# Patient Record
Sex: FEMALE | URBAN - METROPOLITAN AREA
[De-identification: names, ages, dates, MRNs, and addresses within clinical notes are randomized per-mention and may not be internally consistent; named-entity substitution may affect disease eponyms.]

---

## 2022-08-15 ENCOUNTER — TELEPHONE (OUTPATIENT)
Dept: MAMMOGRAPHY | Facility: HOSPITAL | Age: 77
End: 2022-08-15

## 2022-08-15 NOTE — TELEPHONE ENCOUNTER
Spoke with Brett Lindsay regarding [place] Lymph Node mapping to be done in nuclear medicine department on 8-24-22 the day before lumpectomy surgery scheduled for 8-25-22 with Dr Jarrett Mckeon. Also reviewed wire localization to be done in [facility]. Both procedures explained and all questions answered. Pt to be transported to each department by [facility] through [employer]. Will make every effort to ensure privacy and safety when transported between departments. Breast Care Coordinator to provide education and written information regarding lumpectomy surgery, breast cancer and lymphedema. The patient is aware to arrive 45 minutes early on 8-24 for her Nuclear Medicine procedure and to park in the [place] garage. Pt verbalized understanding and had no further questions at this time.

## 2022-08-22 RX ORDER — ASPIRIN 81 MG/1
81 TABLET, CHEWABLE ORAL DAILY
COMMUNITY

## 2022-08-22 RX ORDER — GLIPIZIDE 10 MG/1
1 TABLET, FILM COATED, EXTENDED RELEASE ORAL 2 TIMES DAILY
COMMUNITY
Start: 2022-01-24

## 2022-08-22 RX ORDER — PEDI MULTIVIT NO.12 W-FLUORIDE 0.25 MG
1 TABLET,CHEWABLE ORAL DAILY
COMMUNITY

## 2022-08-22 RX ORDER — LEVOTHYROXINE SODIUM 0.03 MG/1
25 TABLET ORAL
COMMUNITY
Start: 2022-03-09

## 2022-08-22 RX ORDER — TURMERIC 400 MG
CAPSULE ORAL
COMMUNITY

## 2022-08-22 RX ORDER — LISINOPRIL 20 MG/1
TABLET ORAL
COMMUNITY
Start: 2022-07-03

## 2022-08-22 RX ORDER — ANTIOX #8/OM3/DHA/EPA/LUT/ZEAX 250-2.5 MG
1 CAPSULE ORAL 2 TIMES DAILY
COMMUNITY

## 2022-08-22 RX ORDER — IPRATROPIUM BROMIDE AND ALBUTEROL SULFATE 2.5; .5 MG/3ML; MG/3ML
3 SOLUTION RESPIRATORY (INHALATION) 4 TIMES DAILY
COMMUNITY
Start: 2022-02-17 | End: 2023-02-17

## 2022-08-22 RX ORDER — PRAVASTATIN SODIUM 20 MG
20 TABLET ORAL DAILY
COMMUNITY
Start: 2022-01-24

## 2022-08-22 RX ORDER — LEVOTHYROXINE SODIUM 0.15 MG/1
150 TABLET ORAL DAILY
COMMUNITY
Start: 2022-07-04

## 2022-08-22 RX ORDER — NIFEDIPINE 30 MG/1
1 TABLET, EXTENDED RELEASE ORAL DAILY
COMMUNITY
Start: 2022-06-04

## 2022-08-24 ENCOUNTER — HOSPITAL ENCOUNTER (OUTPATIENT)
Dept: NUCLEAR MEDICINE | Facility: HOSPITAL | Age: 77
Discharge: HOME | End: 2022-08-24
Attending: SURGERY
Payer: MEDICARE

## 2022-08-24 DIAGNOSIS — C50.012 MALIGNANT NEOPLASM INVOLVING BOTH NIPPLE AND AREOLA OF LEFT BREAST IN FEMALE (HCC): ICD-10-CM

## 2022-08-24 PROCEDURE — 78195 LYMPH SYSTEM IMAGING: CPT | Performed by: SURGERY

## 2022-08-24 RX ORDER — LIDOCAINE AND PRILOCAINE 25; 25 MG/G; MG/G
CREAM TOPICAL
Status: DISPENSED
Start: 2022-08-24 | End: 2022-08-24

## 2022-08-24 NOTE — PAT NURSING NOTE
Several attempt made to PCP office to obtain hard copy of EKG. Per Kenyatta Valles they do not have copy of EKG, it is at the hospital she had it done at, she will contact pt to attempt to get it. I will order EKG stat on admit.

## 2022-08-25 ENCOUNTER — HOSPITAL ENCOUNTER (OUTPATIENT)
Facility: HOSPITAL | Age: 77
Setting detail: HOSPITAL OUTPATIENT SURGERY
Discharge: HOME | End: 2022-08-25
Attending: SURGERY | Admitting: SURGERY
Payer: MEDICARE

## 2022-08-25 ENCOUNTER — HOSPITAL ENCOUNTER (OUTPATIENT)
Dept: MAMMOGRAPHY | Facility: HOSPITAL | Age: 77
Discharge: HOME | End: 2022-08-25
Attending: SURGERY
Payer: MEDICARE

## 2022-08-25 ENCOUNTER — ANESTHESIA (OUTPATIENT)
Dept: SURGERY | Facility: HOSPITAL | Age: 77
End: 2022-08-25
Payer: MEDICARE

## 2022-08-25 ENCOUNTER — ANESTHESIA EVENT (OUTPATIENT)
Dept: SURGERY | Facility: HOSPITAL | Age: 77
End: 2022-08-25
Payer: MEDICARE

## 2022-08-25 VITALS
HEART RATE: 75 BPM | SYSTOLIC BLOOD PRESSURE: 140 MMHG | DIASTOLIC BLOOD PRESSURE: 78 MMHG | OXYGEN SATURATION: 97 % | HEIGHT: 62 IN | WEIGHT: 244 LBS | BODY MASS INDEX: 44.9 KG/M2 | RESPIRATION RATE: 18 BRPM | TEMPERATURE: 98 F

## 2022-08-25 DIAGNOSIS — Z01.812 ENCOUNTER FOR PREOPERATIVE SCREENING LABORATORY TESTING FOR COVID-19 VIRUS: Primary | ICD-10-CM

## 2022-08-25 DIAGNOSIS — Z20.822 ENCOUNTER FOR PREOPERATIVE SCREENING LABORATORY TESTING FOR COVID-19 VIRUS: Primary | ICD-10-CM

## 2022-08-25 DIAGNOSIS — C50.012 MALIGNANT NEOPLASM INVOLVING BOTH NIPPLE AND AREOLA OF LEFT BREAST IN FEMALE (HCC): ICD-10-CM

## 2022-08-25 LAB
ATRIAL RATE: 72 BPM
GLUCOSE BLD-MCNC: 124 MG/DL (ref 70–99)
GLUCOSE BLD-MCNC: 150 MG/DL (ref 70–99)
P AXIS: 58 DEGREES
P-R INTERVAL: 178 MS
Q-T INTERVAL: 416 MS
QRS DURATION: 94 MS
QTC CALCULATION (BEZET): 455 MS
R AXIS: 19 DEGREES
SARS-COV-2 RNA RESP QL NAA+PROBE: NOT DETECTED
T AXIS: 21 DEGREES
VENTRICULAR RATE: 72 BPM

## 2022-08-25 PROCEDURE — 82962 GLUCOSE BLOOD TEST: CPT

## 2022-08-25 PROCEDURE — 88307 TISSUE EXAM BY PATHOLOGIST: CPT | Performed by: SURGERY

## 2022-08-25 PROCEDURE — 88305 TISSUE EXAM BY PATHOLOGIST: CPT | Performed by: SURGERY

## 2022-08-25 PROCEDURE — 76098 X-RAY EXAM SURGICAL SPECIMEN: CPT | Performed by: SURGERY

## 2022-08-25 PROCEDURE — 19281 PERQ DEVICE BREAST 1ST IMAG: CPT | Performed by: SURGERY

## 2022-08-25 PROCEDURE — 0HBU0ZZ EXCISION OF LEFT BREAST, OPEN APPROACH: ICD-10-PCS | Performed by: SURGERY

## 2022-08-25 PROCEDURE — 07B60ZX EXCISION OF LEFT AXILLARY LYMPHATIC, OPEN APPROACH, DIAGNOSTIC: ICD-10-PCS | Performed by: SURGERY

## 2022-08-25 PROCEDURE — 93005 ELECTROCARDIOGRAM TRACING: CPT

## 2022-08-25 PROCEDURE — 93010 ELECTROCARDIOGRAM REPORT: CPT | Performed by: INTERNAL MEDICINE

## 2022-08-25 RX ORDER — CEFAZOLIN SODIUM/WATER 2 G/20 ML
2 SYRINGE (ML) INTRAVENOUS ONCE
Status: COMPLETED | OUTPATIENT
Start: 2022-08-25 | End: 2022-08-25

## 2022-08-25 RX ORDER — ISOSULFAN BLUE 50 MG/5ML
INJECTION, SOLUTION SUBCUTANEOUS AS NEEDED
Status: DISCONTINUED | OUTPATIENT
Start: 2022-08-25 | End: 2022-08-25 | Stop reason: HOSPADM

## 2022-08-25 RX ORDER — HYDROCODONE BITARTRATE AND ACETAMINOPHEN 5; 325 MG/1; MG/1
1 TABLET ORAL ONCE AS NEEDED
Status: COMPLETED | OUTPATIENT
Start: 2022-08-25 | End: 2022-08-25

## 2022-08-25 RX ORDER — DEXTROSE MONOHYDRATE 25 G/50ML
50 INJECTION, SOLUTION INTRAVENOUS
Status: DISCONTINUED | OUTPATIENT
Start: 2022-08-25 | End: 2022-08-25 | Stop reason: HOSPADM

## 2022-08-25 RX ORDER — DIAZEPAM 5 MG/1
5 TABLET ORAL AS NEEDED
Status: DISCONTINUED | OUTPATIENT
Start: 2022-08-25 | End: 2022-08-25 | Stop reason: HOSPADM

## 2022-08-25 RX ORDER — SODIUM CHLORIDE, SODIUM LACTATE, POTASSIUM CHLORIDE, CALCIUM CHLORIDE 600; 310; 30; 20 MG/100ML; MG/100ML; MG/100ML; MG/100ML
INJECTION, SOLUTION INTRAVENOUS CONTINUOUS
Status: DISCONTINUED | OUTPATIENT
Start: 2022-08-25 | End: 2022-08-25

## 2022-08-25 RX ORDER — HYDROCODONE BITARTRATE AND ACETAMINOPHEN 5; 325 MG/1; MG/1
1-2 TABLET ORAL EVERY 4 HOURS PRN
Qty: 20 TABLET | Refills: 0 | Status: SHIPPED | OUTPATIENT
Start: 2022-08-25

## 2022-08-25 RX ORDER — NICOTINE POLACRILEX 4 MG
15 LOZENGE BUCCAL
Status: DISCONTINUED | OUTPATIENT
Start: 2022-08-25 | End: 2022-08-25 | Stop reason: HOSPADM

## 2022-08-25 RX ORDER — ACETAMINOPHEN 500 MG
1000 TABLET ORAL ONCE AS NEEDED
Status: COMPLETED | OUTPATIENT
Start: 2022-08-25 | End: 2022-08-25

## 2022-08-25 RX ORDER — NALOXONE HYDROCHLORIDE 0.4 MG/ML
80 INJECTION, SOLUTION INTRAMUSCULAR; INTRAVENOUS; SUBCUTANEOUS AS NEEDED
Status: DISCONTINUED | OUTPATIENT
Start: 2022-08-25 | End: 2022-08-25

## 2022-08-25 RX ORDER — HYDROCODONE BITARTRATE AND ACETAMINOPHEN 5; 325 MG/1; MG/1
2 TABLET ORAL ONCE AS NEEDED
Status: COMPLETED | OUTPATIENT
Start: 2022-08-25 | End: 2022-08-25

## 2022-08-25 RX ORDER — LIDOCAINE HYDROCHLORIDE 10 MG/ML
INJECTION, SOLUTION EPIDURAL; INFILTRATION; INTRACAUDAL; PERINEURAL AS NEEDED
Status: DISCONTINUED | OUTPATIENT
Start: 2022-08-25 | End: 2022-08-25 | Stop reason: SURG

## 2022-08-25 RX ORDER — HYDROMORPHONE HYDROCHLORIDE 1 MG/ML
0.4 INJECTION, SOLUTION INTRAMUSCULAR; INTRAVENOUS; SUBCUTANEOUS EVERY 5 MIN PRN
Status: DISCONTINUED | OUTPATIENT
Start: 2022-08-25 | End: 2022-08-25

## 2022-08-25 RX ORDER — ONDANSETRON 2 MG/ML
4 INJECTION INTRAMUSCULAR; INTRAVENOUS EVERY 6 HOURS PRN
Status: DISCONTINUED | OUTPATIENT
Start: 2022-08-25 | End: 2022-08-25

## 2022-08-25 RX ORDER — LIDOCAINE HYDROCHLORIDE 10 MG/ML
INJECTION, SOLUTION INFILTRATION; PERINEURAL AS NEEDED
Status: DISCONTINUED | OUTPATIENT
Start: 2022-08-25 | End: 2022-08-25 | Stop reason: HOSPADM

## 2022-08-25 RX ORDER — METOCLOPRAMIDE HYDROCHLORIDE 5 MG/ML
10 INJECTION INTRAMUSCULAR; INTRAVENOUS EVERY 8 HOURS PRN
Status: DISCONTINUED | OUTPATIENT
Start: 2022-08-25 | End: 2022-08-25

## 2022-08-25 RX ORDER — HYDROMORPHONE HYDROCHLORIDE 1 MG/ML
0.6 INJECTION, SOLUTION INTRAMUSCULAR; INTRAVENOUS; SUBCUTANEOUS EVERY 5 MIN PRN
Status: DISCONTINUED | OUTPATIENT
Start: 2022-08-25 | End: 2022-08-25

## 2022-08-25 RX ORDER — MEPERIDINE HYDROCHLORIDE 25 MG/ML
12.5 INJECTION INTRAMUSCULAR; INTRAVENOUS; SUBCUTANEOUS AS NEEDED
Status: DISCONTINUED | OUTPATIENT
Start: 2022-08-25 | End: 2022-08-25

## 2022-08-25 RX ORDER — NICOTINE POLACRILEX 4 MG
30 LOZENGE BUCCAL
Status: DISCONTINUED | OUTPATIENT
Start: 2022-08-25 | End: 2022-08-25 | Stop reason: HOSPADM

## 2022-08-25 RX ORDER — DEXAMETHASONE SODIUM PHOSPHATE 4 MG/ML
VIAL (ML) INJECTION AS NEEDED
Status: DISCONTINUED | OUTPATIENT
Start: 2022-08-25 | End: 2022-08-25 | Stop reason: SURG

## 2022-08-25 RX ORDER — ONDANSETRON 2 MG/ML
INJECTION INTRAMUSCULAR; INTRAVENOUS AS NEEDED
Status: DISCONTINUED | OUTPATIENT
Start: 2022-08-25 | End: 2022-08-25 | Stop reason: SURG

## 2022-08-25 RX ORDER — HYDROMORPHONE HYDROCHLORIDE 1 MG/ML
0.2 INJECTION, SOLUTION INTRAMUSCULAR; INTRAVENOUS; SUBCUTANEOUS EVERY 5 MIN PRN
Status: DISCONTINUED | OUTPATIENT
Start: 2022-08-25 | End: 2022-08-25

## 2022-08-25 RX ORDER — ACETAMINOPHEN 500 MG
1000 TABLET ORAL ONCE
Status: DISCONTINUED | OUTPATIENT
Start: 2022-08-25 | End: 2022-08-25 | Stop reason: HOSPADM

## 2022-08-25 RX ADMIN — SODIUM CHLORIDE, SODIUM LACTATE, POTASSIUM CHLORIDE, CALCIUM CHLORIDE: 600; 310; 30; 20 INJECTION, SOLUTION INTRAVENOUS at 10:21:00

## 2022-08-25 RX ADMIN — DEXAMETHASONE SODIUM PHOSPHATE 4 MG: 4 MG/ML VIAL (ML) INJECTION at 10:26:00

## 2022-08-25 RX ADMIN — LIDOCAINE HYDROCHLORIDE 50 MG: 10 INJECTION, SOLUTION EPIDURAL; INFILTRATION; INTRACAUDAL; PERINEURAL at 10:24:00

## 2022-08-25 RX ADMIN — SODIUM CHLORIDE, SODIUM LACTATE, POTASSIUM CHLORIDE, CALCIUM CHLORIDE: 600; 310; 30; 20 INJECTION, SOLUTION INTRAVENOUS at 12:24:00

## 2022-08-25 RX ADMIN — ONDANSETRON 4 MG: 2 INJECTION INTRAMUSCULAR; INTRAVENOUS at 10:35:00

## 2022-08-25 RX ADMIN — CEFAZOLIN SODIUM/WATER 2 G: 2 G/20 ML SYRINGE (ML) INTRAVENOUS at 10:26:00

## 2022-08-25 NOTE — IMAGING NOTE
Assisted  with mammography guided needle localization of the left breast.   Arcadio Kumar identified with spelling of name and date of birth. Medications and allergies reviewed. NKDA reported. History: left breast  Invasive ductal carcinoma. Surgery: LEFT BREAST NEEDLE LOCALIZATION LUMPECTOMY WITH LEFT SENTINEL LYMPH NODE BIOPSY    Order verified. Procedure explained and questions answered. Arcadio Kumar verbalized understanding and agreement. 0845: Written consent obtained by imaging staff.      0900: Scans taken by [facility]-mammography technologist    1621: Site prepped in a sterile manner. 4091: Dr. Rosalie Vieira  present    96 86 26: Time out complete    0905: Lidocaine administered for anesthetic affect.  0906: Tuloc 20G x 120mm needle placed left breast 12 o'clock position butterfly shaped marker    Emotional support provided. Arcadio Kumar tolerated procedure well. Site cleaned. Wire secured with pink clip, steri strips, sterile 4x4 gauze dressing, and Tegaderm. Arcadio Kumar transported via wheelchair to pre-op/surgery holding in stable condition. Ms. Yuridia Medellin without complaints or concerns at this time.

## 2022-08-25 NOTE — BRIEF OP NOTE
Pre-Operative Diagnosis: MALIGNANT NEOPLASM OF LEFT BREAST     Post-Operative Diagnosis: MALIGNANT NEOPLASM OF LEFT BREAST      Procedure Performed:   LEFT BREAST NEEDLE LOCALIZATION LUMPECTOMY WITH LEFT SENTINEL LYMPH NODE BIOPSY    Surgeon(s) and Role:     * Opal Mendez MD - Primary    Assistant(s):  PA: VINAY Peralta     Assistant was medically necessary for patient positioning, suctioning and retraction of tissues     Surgical Findings: see above     Specimen: to pathology     Estimated Blood Loss: Blood Output: 10 mL (8/25/2022 11:54 AM)      Reji Levine MD  8/25/2022  12:23 PM

## 2022-08-25 NOTE — OPERATIVE REPORT
Pre-Operative Diagnosis: MALIGNANT NEOPLASM OF LEFT BREAST     Post-Operative Diagnosis: MALIGNANT NEOPLASM OF LEFT BREAST      Procedure Performed:   LEFT BREAST NEEDLE LOCALIZATION LUMPECTOMY WITH LEFT SENTINEL LYMPH NODE BIOPSY    Surgeon(s) and Role:     * Rachel Gomez MD - Primary    Assistant(s):  PA: VINAY Tejada     Assistant was medically necessary for patient positioning, suctioning and retraction of tissues     Surgical Findings: see above     Specimen: to pathology     Estimated Blood Loss: Blood Output: 10 mL (8/25/2022 11:54 AM)      Joslyn Fried MD

## 2022-08-25 NOTE — ANESTHESIA PROCEDURE NOTES
Airway  Date/Time: 8/25/2022 10:25 AM  Urgency: elective      General Information and Staff    Patient location during procedure: OR  Anesthesiologist: Arabella Masterson MD  Resident/CRNA: Shirley Barajas CRNA  Performed: CRNA     Indications and Patient Condition  Indications for airway management: anesthesia  Sedation level: deep  Preoxygenated: yes  Patient position: sniffing  Mask difficulty assessment: 0 - not attempted    Final Airway Details  Final airway type: supraglottic airway      Successful airway: classic  Size 3      Number of attempts at approach: 1

## 2022-08-25 NOTE — ANESTHESIA POSTPROCEDURE EVALUATION
[facility] Patient Status:  Hospital Outpatient Surgery   Age/Gender 65year old female MRN [id number]   [facility] SURGERY Attending Warren Pierce MD   Hosp Day # 0 PCP APRIL AVILA       Anesthesia Post-op Note    LEFT BREAST NEEDLE LOCALIZATION LUMPECTOMY WITH LEFT SENTINEL LYMPH NODE BIOPSY    Procedure Summary     Date: 08/25/22 Room / Location: [facility] MAIN OR [address] [place] [address] MAIN OR    Anesthesia Start: 8037 Anesthesia Stop: 1286    Procedure: LEFT BREAST NEEDLE LOCALIZATION LUMPECTOMY WITH LEFT SENTINEL LYMPH NODE BIOPSY (Left Breast) Diagnosis: (MALIGNANT NEOPLASM OF LEFT BREAST)    Surgeons: Warren Pierce MD Anesthesiologist: Soco Luz MD    Anesthesia Type: general ASA Status: 2          Anesthesia Type: general    Vitals Value Taken Time   /77 08/25/22 1228   Temp 98.2 08/25/22 1231   Pulse 75 08/25/22 1230   Resp 17 08/25/22 1230   SpO2 95 % 08/25/22 1230   Vitals shown include unvalidated device data. Patient Location: PACU    Anesthesia Type: general    Airway Patency: patent    Postop Pain Control: adequate    Mental Status: mildly sedated but able to meaningfully participate in the post-anesthesia evaluation    Nausea/Vomiting: none    Cardiopulmonary/Hydration status: stable euvolemic    Complications: no apparent anesthesia related complications    Postop vital signs: stable    Dental Exam: Unchanged from Preop    Patient to be discharged from PACU when criteria met.

## 2022-08-26 NOTE — OPERATIVE REPORT
[facility]    PATIENT'S NAME: Michael Lopze   ATTENDING PHYSICIAN: Rosie Garcia M.D. OPERATING PHYSICIAN: Rosie Garcia M.D. PATIENT ACCOUNT#:   [de-identified]    LOCATION:  [address] [place] [address]  MEDICAL RECORD #:   [id number]       YOB: 1945  ADMISSION DATE:       08/25/2022      OPERATION DATE:  08/25/2022    OPERATIVE REPORT      PREOPERATIVE DIAGNOSIS:  Left breast cancer. POSTOPERATIVE DIAGNOSIS:  Left breast cancer. PROCEDURE:  Left breast needle-localized lumpectomy and left sentinel lymph node biopsy. ASSISTANT:  VINAY Solo. Assistant was medically necessary for patient positioning, suction, and retraction of tissues. ANESTHESIA:  General.    INDICATIONS:  The patient is a 77-year-old lady diagnosed with left breast cancer. We discussed options of breast conservation and mastectomy and pros and cons of each option. She wishes to proceed with breast conservation. The surgery of left breast needle-localized lumpectomy and left sentinel lymph node biopsy was discussed with her in detail as well as potential risks and complications, not limited to infection, bleeding, anesthetic risk, heart attack, stroke, and death. The possibility of requiring another surgery based on final pathology was discussed. The possibility of local or distant recurrence and lymphedema were discussed as well. All her questions were answered to her satisfaction. She is agreeable to proceed. OPERATIVE TECHNIQUE:  Informed consent obtained. She was taken to the operating room, SCDs placed, IV antibiotics given, general anesthesia induced. I injected 5 mL of Lymphazurin blue dye in the left breast subareolar area. Breast massage was carried out for 5 minutes. The left breast and axilla were prepped and draped in usual fashion. After infiltration with 1% lidocaine, I made a curvilinear incision in the left axilla.   Incision made using scalpel and carried down through the clavipectoral fascia. Two sentinel lymph nodes were identified and sent to Pathology for routine analysis. I examined the axilla for any further hot, palpable, blue lymph nodes, and there were none noted. I turned my attention to the left breast.  I made curvilinear incision in the left breast areolar border. Incision made using a scalpel and carried down to the breast parenchyma. Using electrocautery, I dissected around the guidewire. In this fashion, specimen was removed in 1 piece and tagged in the following manner:  Long stitch lateral, short stitch superficial.  I used Faxitron to x-ray the specimen and the clip was noted in there. I also took a deep margin and a stitch was placed in the true margin. At the end of the deep margin, the X-clip was also noted and this was included with the deep margin and this was also x-rayed and the clip was noted in there. I examined the lumpectomy cavity. No abnormal areas were palpated. The wound was irrigated, suctioned dry, examined for bleeding. Hemostasis was meticulously maintained using electrocautery. Hemoclips were placed. Dermis reapproximated with interrupted 3-0 Vicryl sutures, skin with a 4-0 Vicryl subcuticular stitch. I examined the axilla, irrigated, suctioned dry, examined for bleeding. Hemostasis was meticulously maintained using electrocautery. Dermis reapproximated with interrupted 3-0 Vicryl sutures, skin with a 4-0 Vicryl subcuticular stitch. Steri-Strips and light compression dressing were applied. I discussed my findings with the patient's family who accompanies her.     Dictated By Xuan Rolle M.D.  d: 08/25/2022 12:27:03  t: 08/25/2022 22:05:58  Job [id number]  CO/

## (undated) DEVICE — UNDYED BRAIDED (POLYGLACTIN 910), SYNTHETIC ABSORBABLE SUTURE: Brand: COATED VICRYL

## (undated) DEVICE — SUT SILK 0 FSL 678G

## (undated) DEVICE — VIOLET BRAIDED (POLYGLACTIN 910), SYNTHETIC ABSORBABLE SUTURE: Brand: COATED VICRYL

## (undated) DEVICE — SUT ETHILON 5-0 P-3 689H

## (undated) DEVICE — 3M™ IOBAN™ 2 ANTIMICROBIAL INCISE DRAPE 6648EZ: Brand: IOBAN™ 2

## (undated) DEVICE — UNDERPAD 23X36 LIGHT CHUX

## (undated) DEVICE — PLASTIC BREAST CDS-LF: Brand: MEDLINE INDUSTRIES, INC.

## (undated) DEVICE — MEGADYNE E-Z CLEAN BLADE 4IN

## (undated) DEVICE — SLEEVE KENDALL SCD EXPRESS MED

## (undated) DEVICE — SUT VICRYL 3-0 SH J416H

## (undated) DEVICE — GOWN,SIRUS,FABRIC-REINFORCED,X-LARGE: Brand: MEDLINE

## (undated) DEVICE — SHEET,DRAPE,40X58,STERILE: Brand: MEDLINE

## (undated) DEVICE — BRA SURGICAL ELIZABETH PINK L

## (undated) DEVICE — PROVE COVER: Brand: UNBRANDED

## (undated) DEVICE — 3M™ STERI-STRIP™ REINFORCED ADHESIVE SKIN CLOSURES, R1548, 1 IN X 5 IN (25 MM X 125 MM), 4 STRIPS/ENVELOPE: Brand: 3M™ STERI-STRIP™

## (undated) DEVICE — APPLICATOR CHLORAPREP 10.5ML

## (undated) DEVICE — HEMOCLIP HORIZON MED 002200

## (undated) DEVICE — HEMOCLIP HORIZON SM 001200

## (undated) DEVICE — PAD SACRAL PREMIUM 12X12X1

## (undated) DEVICE — STERILE POLYISOPRENE POWDER-FREE SURGICAL GLOVES: Brand: PROTEXIS

## (undated) NOTE — LETTER
OUTSIDE TESTING RESULT REQUEST     IMPORTANT: FOR YOUR IMMEDIATE ATTENTION  Please FAX all test results listed below to: [phone]     Testing already done on or about: 2022     * * * * If testing is NOT complete, arrange with patient A.S.A.P. * * * *      Patient Name: Nile Perkins  Surgery Date: 2022  CSN: [id number]  Medical Record: [id number]   : 3/18/1945 - A: 65 y      Sex: female  Surgeon(s):  Kyle Levy MD  Procedure: LEFT BREAST NEEDLE LOCALIZATION LUMPECTOMY WITH LEFT SENTINEL LYMPH NODE BIOPSY  Anesthesia Type: General     Surgeon: Kyle Levy MD     The following Testing and Time Line are REQUIRED PER ANESTHESIA     EKG READ AND SIGNED WITHIN   90 days  BMP (requires 4 hour fast) within  90 days      Thank You,   Sent by:Mary